# Patient Record
Sex: FEMALE | Race: AMERICAN INDIAN OR ALASKA NATIVE
[De-identification: names, ages, dates, MRNs, and addresses within clinical notes are randomized per-mention and may not be internally consistent; named-entity substitution may affect disease eponyms.]

---

## 2018-01-30 ENCOUNTER — HOSPITAL ENCOUNTER (OUTPATIENT)
Dept: HOSPITAL 5 - SPVIMAG | Age: 73
Discharge: HOME | End: 2018-01-30
Attending: ORTHOPAEDIC SURGERY
Payer: MEDICARE

## 2018-01-30 DIAGNOSIS — Z96.652: ICD-10-CM

## 2018-01-30 DIAGNOSIS — M85.861: ICD-10-CM

## 2018-01-30 DIAGNOSIS — M17.11: Primary | ICD-10-CM

## 2018-01-30 DIAGNOSIS — M85.862: ICD-10-CM

## 2018-01-30 NOTE — XRAY REPORT
FINAL REPORT



PROCEDURE:  XR KNEE BILAT 4+V



TECHNIQUE:  Bilateral knee radiographs, 4 or more views,

including AP, lateral, sunrise and oblique views.



HISTORY:  Bilateral knee pain. 



COMPARISON:  No prior studies are available for comparison.



FINDINGS:  

Right 



Fracture (s) and/or Dislocation(s): Slight irregularity about the

medial femoral condyle. 



Alignment: Slight genu valgum. 



Joint space(s): Moderate to severe lateral and patellofemoral

compartment narrowing and osteophytes with eburnation and

subchondral cysts. Moderate medial compartment narrowing and

osteophytes.



Soft tissues: Normal. 



Bone mineralization: Osteopenia. 



Foreign bodies: None. 



Left 



Fracture (s) and/or Dislocation(s): None .



Alignment: Normal.



Joint space(s): Left total knee replacement. Small joint

effusion.



Soft tissues: Normal. 



Bone mineralization: Osteopenia 



Foreign bodies: None. 



IMPRESSION:  

Osteopenia.



Left knee replacement without radiographic evidence of

complication. Small joint effusion.



Moderate to severe degenerative changes the right knee, findings

most evident in the lateral compartment. Slight irregularity

about the medial femoral condyle, consider could be related to

chronic posttraumatic and/or degenerative change, consider

Birgit-Stieda lesion.